# Patient Record
(demographics unavailable — no encounter records)

---

## 2018-01-01 NOTE — PDOC.PED
Subjective:





Patient resting peacefully on exam. No significant overnight events. Patient 

has been doing well. He did vomit x2 yesterday, but he appears to be eating 

well and tolerating PO otherwise. Patient has been afebrile. Patient happy and 

playful on repeat exam. 





Objective:


 Vital Signs (12 hours)











  Temp Pulse Resp Pulse Ox


 


 12/22/18 05:00  97.7 F  140 H  40  94 L


 


 12/22/18 03:45     92 L


 


 12/22/18 02:30   114   93 L


 


 12/22/18 00:55  98.2 F  132 H  36  92 L


 


 12/21/18 23:23   106  36  92 L


 


 12/21/18 21:50     95


 


 12/21/18 20:30     92 L


 


 12/21/18 20:05  97.9 F  143 H  64 H  92 L


 


 12/21/18 19:26   140 H  30  91 L








 Weight











Admit Weight                   8.86 kg


 


Weight                         9.197 kg














 











 12/20/18 12/21/18 12/22/18





 06:59 06:59 06:59


 


Intake Total  240 600


 


Output Total  344 1012


 


Balance  -104 -412














Lab/Radiology


Result Diagrams: 


 12/20/18 Unknown





 12/20/18 Unknown





Phys Exam





- Physical Examination


Constitutional: NAD


HEENT: moist MMs


Nasal congestion


course rhonchi throughout


Cardiovascular: RRR, no significant murmur


Gastrointestinal: soft, non-tender, no distention


Musculoskeletal: no edema


Neurological: moves all 4 limbs


Skin: no rash, cap refill <2 seconds





Assessment/Plan:


(1) CAP (community acquired pneumonia)


Code(s): J18.9 - PNEUMONIA, UNSPECIFIED ORGANISM   Status: Acute   Comment: - >

90% on RA, no retractions


- BCx growing MRSE x1; likely contaminant


- Flu negative, RSV/Parainflu + on RVP


- Lactate 2.5, procal 0.06


- S/p 1x Ampicillin in ED, Ceftriaxone d/c'd yesterday. Procal neg, likely 

viral cause of symptoms


- Continue prednisolone


- Albuterol q4hr scheduled & q2h PRN


- Tylenol PRN for fever


- Monitor volume status with strict I&Os and daily wts


   





(2) Moderate dehydration


Code(s): E86.0 - DEHYDRATION   Status: Acute   Comment: - Good urine output 

overnight


- Decrease IVF


- Encourage PO intake


- Continue to monitor volume status   





(3) Methicillin resistant Staphylococcus epidermidis infection


Code(s): A49.8 - OTHER BACTERIAL INFECTIONS OF UNSPECIFIED SITE; Z16.29 - 

RESISTANCE TO OTHER SINGLE SPECIFIED ANTIBIOTIC   Status: Acute   Comment: 

Blood culture 1/1 in child; this likely represents contaminant. We will follow 

up with patient after they are d/c'd home to ensure they continue to do well.  

  





(4) RSV (acute bronchiolitis due to respiratory syncytial virus)


Status: Acute   Comment: Conservative management. Push fluids. Nebulizers PRN. 

Requested that nurses deep suction patient.    





(5) Parainfluenza


Code(s): B33.8 - OTHER SPECIFIED VIRAL DISEASES   Status: Acute   Comment: 

Started on prednisolone.    





Dispo: Stable. Plan to d/c home today with close follow up. 





Addendum - Attending





- Attending Attestation


Date/Time: 12/22/18 1011





I personally evaluated the patient and discussed the management with Dr. Del Rosario.


I agree with the History, Examination, Assessment and Plan documented above 

with any addition or exceptions noted below.


Hospital day # 2. Per mom is feeding better and making numerous wet diapers. 


Lungs with coarse sounds and transmitted upper airway sounds. No retractions. 

Breathing comfortably. 


MRSJODIE on blood culture is likely contaminant. Had negative procalcitonin and 

clinically appears well. 


Can d/c to home today.


Strict return precautions reviewed.

## 2018-01-01 NOTE — PDOC.FPRHP
- History of Present Illness


Chief Complaint: Cough


History of Present Illness: 


5mo old male with no pmh, born at term by , presents for cough that started 

Tuesday, fever with tmax of 101.2 and decreased PO intake and output. Was seen 

by their PCP at [facility] yesterday and reportedly showed improvement with 

albuterol inhalers. Father is present and reports worsening of symptoms this 

AM. He reports pt has not had anything to eat or drink since  and no wet 

diapers for 2 days. Up to date on vaccinations, Denies sick contacts. Reports 

this is the first time he has been sick. Denies rashes, vomiting. Per nursing 

pt was sitting up and playful prior to IV access attempts. 


 





- Allergies/Adverse Reactions


 Allergies











Allergy/AdvReac Type Severity Reaction Status Date / Time


 


No Known Drug Allergies Allergy   Unverified 18 10:30














- History


PMHx:  at term, no hx of pulm problems, up to date of vaccinations. 


 


PSHx: None





FHx: Unremarkable 


 


Social: Lives at home with mother and father. 


 








- Review of Systems


General: reports: fever/chills, weight/appetite/sleep changes


ENT: reports: nasal congestion, rhinorrhea


Respiratory: reports: cough, congestion, other (Denies increased work of 

breathing)


Gastrointestinal: denies: nausea, vomiting, diarrhea, constipation


Genitourinary: reports: other (Decreased urination)


Skin: denies: rashes, lesions


Neurological: denies: seizure


Psychological: reports: other (fussiness)





- Vital signs


HR: 171 RR: 42 Tmax: 101.1 Pox: 95% on RA  Wt: 9kg   








- Physical Exam


Constitutional: well developed


-Constitutional: 





crying, receiving albuterol treatment


HEENT: normocephalic and atraumatic, MMM


-HEENT: 


making tears


Neck: supple, trachea midline


Heart: normal S1/S2


-Heart: 


tachycardic


-Lungs: 


Diffuse wheezing and rhonchi


Abdomen: soft, non-tender, bowel sounds present, no masses/distention


Skin: capillary refill <2 seconds


Heme/Lymphatic: no unusual bruising or bleeding





FMR H&P: Results





- Labs


Result Diagrams: 


 18 Unknown





 18 Unknown





- Radiology Interpretation


  ** Chest x-ray


Status: image reviewed by me, report reviewed by me


Additional comment: 


Increased density in right infrahilar region. No focal consolidation











FMR H&P: A/P





- Problem List


(1) CAP (community acquired pneumonia)


Current Visit: Yes   Status: Acute   Code(s): J18.9 - PNEUMONIA, UNSPECIFIED 

ORGANISM   





(2) Moderate dehydration


Current Visit: Yes   Status: Acute   Code(s): E86.0 - DEHYDRATION   





- Plan


Sepsis 2/2 RML CAP


- 95% on RA, no retractions


- BCx pending


- RSV/Flu negative


- Lactate 2.5, procal pending


- S/p 1x Ampicillin in ED, will transition to Ceftriaxone


- Continue prednisilone


- Albuterol q4hr scheduled & q2h PRN


- Tylenol PRN for fever


- Monitor volume status with strict I&Os and daily wts


- Admit to peds 





Moderate Dehydration (10% deficit)


- Tachycardic and reportedly no PO intake or urine output for last 2 days


- On exam cap refill <2secs, MMM and tear production


- Will give a 20 mL/Kg bolus of NS now + 1/2 of the fluid deficit over the 

first 8 hours (75mls/hr) then second half over the subsequent 16 hrs (65mls/hr)


- Encourage PO intake


- Continue to monitor volume status








FMR H&P: Upper Level





- Pertinent history





5 month old M w/ no significant PMHx presents for evaluation of cough since 

this past Tuesday and fever to 101 DegF starting yesterday. Reportedly seen by 

PCP yesterday and given albuterol neb. Notes sxs worsened this AM prompting ER 

visit. Father states patient has not had any PO intake or UOP since . CXR 

obtained in the ER showing R-infrahilar wedge shaped infiltrate. Sepsis blood 

work pending at time of this note. Denies any sick contacts. UTD on 

vaccinations. No prior hx of pulmonary issues. Other hx per intern portion of 

the HPI. 





- Pertinent findings





P: 171  RR: 42  O2sat: 95% on RA Temp: 101.1 DegF  Weight: 9.11 kg





CXR - RML infiltrate





GEN: NAD, fussy but consolable


HEENT: Normocephalic, making tears, MMM


CARD: Tachycardic, no overt murmur, cap refill <2 seconds


PULM: Diffuse rhonci and expiratory wheezes throughout. Mild subcostal 

retractions. Good overall air movement. 








- Plan


Date/Time: 18 1013








I, B. Jesus Garcia MD, have evaluated this patient and agree with findings/plan 

as outlined by intern resident. Pertinent changes/additions are listed here.





5 month old M w/:





1) Sepsis 2/2 RML PNA


- Pt overall non-toxic appearing in no respiratory distress


- Will admit pedi/inpatient on weight based IV abx w/ Rocephin 


- BCx obtained in the ER. RSV and Flu negative


- Will obtain Pro-dang and lactic acid to further evaluate


- Cont. w/ Steroids and will have scheduled and PRN nebs for SOB/Wheezing 

available


- Weight based tylenol for fever


- IVF per #2


- Will monitor strict I/O's and titrate fluids as needed pending PO intake 





2) Moderate Dehydration


- Pt tachycardic and w/ decreased PO and UOP


- Still making tears and w/ normal cap refill on exam


- Approx. 10% down giving him an approx. 1000 mL deficit


- Will give a 20 mL/Kg bolus of NS now then finish replacing the remaining 1/2 

of the fluid deficit over the first 8 hours then the second half over the 

subsequent 16 hrs


- Will titrate fluid as needed pending patient's ability to take in PO


- LA pending





Assessment and Plan Discussed w/ Dr. Dela Cruz who is in agreement.

## 2018-01-01 NOTE — RAD
FRONTAL AND LATERAL IMAGING OF THE CHEST:

 

Date: 12-20-18 

 

Comparison: None. 

 

History: Cough, wheezing, fever, shortness of breath. 

 

FINDINGS: 

No pneumothorax or pleural fluid is seen and there is no lobar consolidation or alveolar edema. There
 is mild increased linear density in the right infrahilar region. This may represent minimal right pe
rihilar infiltrate or vascular prominence. Osseous structures are grossly unremarkable. 

 

IMPRESSION: 

Minimal increased density in the right infrahilar region. No focal consolidation.

 

POS: OFF

## 2018-01-01 NOTE — PQF
[employer] Win[employer] ViewerMARANDA MIX KAYLA *r

[id number]                                                             [address] [place] [address]

[id number]                             

                                   

CLINICAL DOCUMENTATION CLARIFICATION FORM:  POST DISCHARGE



Addendum to original discharge summary date:  __________________________________
____



Late entry note date:  _________________________________________________________
__





Please exercise your independent, professional judgment in responding to the 
clarification form. 

Clinical indicators are provided on the bottom of this form for your review





Please check appropriate box(s) to clarify if the following diagnosis has been 
ruled in or ruled out:



Sepsis is



[  ] Ruled in diagnosis

     [  ] Continue to treat        [  ] Resolved

[  ] Ruled out diagnosis

[  ] Cannot rule out diagnosis

[  ] Other diagnosis ___________

[  ] Unable to determine







For continuity of documentation, please document condition throughout progress 
notes and discharge summary.  Thank You.



CLINICAL INDICATORS - SIGNS / SYMPTOMS / LABS



Temp- 101.1 -documented in H&P signed by Rosenda Lynch MD



[id number] in H&P signed by Rosenda Lynch MD



[id number] in H&P signed by Rosenda Lynch MD



Blood culture-MRSE CONTAMINANT.



Lactate -2.5 documented in progress note on 12/21 signed by Rosenda Lynch MD



Progress note on 12/22 by cecile Del Rosario MD states antibiotic discontiued 
procal neg likely viral cause of symptoms.. 



RISK FACTORS



 [employer]

Pneumonia

RSV bronchiolitis

Parainfluenza virus





TREATMENTS



Iv antibiotic initially given and stopped after blood culure results.

IVF

Cultures taken









(This form is maintained as a part of the permanent medical record)

2015 [employer].  All Rights Reserved

Anusa Sujatha Vail Reza [email or web address]    [phone]

                                                              



MTDD